# Patient Record
Sex: MALE | Race: WHITE
[De-identification: names, ages, dates, MRNs, and addresses within clinical notes are randomized per-mention and may not be internally consistent; named-entity substitution may affect disease eponyms.]

---

## 2021-12-09 ENCOUNTER — HOSPITAL ENCOUNTER (INPATIENT)
Dept: HOSPITAL 12 - ER | Age: 76
LOS: 2 days | Discharge: HOME | DRG: 391 | End: 2021-12-11
Payer: COMMERCIAL

## 2021-12-09 VITALS — WEIGHT: 200 LBS | BODY MASS INDEX: 31.39 KG/M2 | HEIGHT: 67 IN

## 2021-12-09 DIAGNOSIS — Z95.5: ICD-10-CM

## 2021-12-09 DIAGNOSIS — Z79.01: ICD-10-CM

## 2021-12-09 DIAGNOSIS — R73.9: ICD-10-CM

## 2021-12-09 DIAGNOSIS — E78.5: ICD-10-CM

## 2021-12-09 DIAGNOSIS — Z86.73: ICD-10-CM

## 2021-12-09 DIAGNOSIS — N17.0: ICD-10-CM

## 2021-12-09 DIAGNOSIS — K59.00: Primary | ICD-10-CM

## 2021-12-09 DIAGNOSIS — R11.0: ICD-10-CM

## 2021-12-09 DIAGNOSIS — G62.9: ICD-10-CM

## 2021-12-09 DIAGNOSIS — I25.10: ICD-10-CM

## 2021-12-09 PROCEDURE — G0378 HOSPITAL OBSERVATION PER HR: HCPCS

## 2021-12-09 PROCEDURE — A4663 DIALYSIS BLOOD PRESSURE CUFF: HCPCS

## 2021-12-10 VITALS — DIASTOLIC BLOOD PRESSURE: 83 MMHG | SYSTOLIC BLOOD PRESSURE: 148 MMHG

## 2021-12-10 VITALS — SYSTOLIC BLOOD PRESSURE: 136 MMHG | DIASTOLIC BLOOD PRESSURE: 90 MMHG

## 2021-12-10 VITALS — DIASTOLIC BLOOD PRESSURE: 75 MMHG | SYSTOLIC BLOOD PRESSURE: 154 MMHG

## 2021-12-10 VITALS — DIASTOLIC BLOOD PRESSURE: 84 MMHG | SYSTOLIC BLOOD PRESSURE: 161 MMHG

## 2021-12-10 LAB
ALP SERPL-CCNC: 79 U/L (ref 50–136)
ALT SERPL W/O P-5'-P-CCNC: 22 U/L (ref 16–63)
APPEARANCE UR: CLEAR
AST SERPL-CCNC: 21 U/L (ref 15–37)
BILIRUB DIRECT SERPL-MCNC: 0.2 MG/DL (ref 0–0.2)
BILIRUB SERPL-MCNC: 1.1 MG/DL (ref 0.2–1)
BILIRUB UR QL STRIP: (no result)
BUN SERPL-MCNC: 19 MG/DL (ref 7–18)
CHLORIDE SERPL-SCNC: 104 MMOL/L (ref 98–107)
CO2 SERPL-SCNC: 28 MMOL/L (ref 21–32)
COLOR UR: (no result)
CREAT SERPL-MCNC: 1.4 MG/DL (ref 0.6–1.3)
DEPRECATED SQUAMOUS URNS QL MICRO: (no result) /HPF
GLUCOSE SERPL-MCNC: 179 MG/DL (ref 74–106)
GLUCOSE UR STRIP-MCNC: NEGATIVE MG/DL
HCT VFR BLD AUTO: 40.6 % (ref 36.7–47.1)
HGB UR QL STRIP: NEGATIVE
KETONES UR STRIP-MCNC: NEGATIVE MG/DL
LEUKOCYTE ESTERASE UR QL STRIP: NEGATIVE
LIPASE SERPL-CCNC: 82 U/L (ref 73–393)
MCH RBC QN AUTO: 33.7 UUG (ref 23.8–33.4)
MCV RBC AUTO: 95.6 FL (ref 73–96.2)
NITRITE UR QL STRIP: NEGATIVE
PH UR STRIP: 6 [PH] (ref 5–8)
PLATELET # BLD AUTO: 269 K/UL (ref 152–348)
POTASSIUM SERPL-SCNC: 4.2 MMOL/L (ref 3.5–5.1)
RBC #/AREA URNS HPF: (no result) /HPF (ref 0–3)
SP GR UR STRIP: >=1.03 (ref 1–1.03)
UROBILINOGEN UR STRIP-MCNC: 0.2 E.U./DL
WBC #/AREA URNS HPF: (no result) /HPF
WBC #/AREA URNS HPF: (no result) /HPF (ref 0–3)
WS STN SPEC: 7.5 G/DL (ref 6.4–8.2)

## 2021-12-10 RX ADMIN — APIXABAN SCH MG: 5 TABLET, FILM COATED ORAL at 20:05

## 2021-12-10 RX ADMIN — HYDROMORPHONE HYDROCHLORIDE PRN MG: 1 INJECTION, SOLUTION INTRAMUSCULAR; INTRAVENOUS; SUBCUTANEOUS at 04:25

## 2021-12-10 RX ADMIN — SODIUM CHLORIDE PRN MLS/HR: 0.9 INJECTION, SOLUTION INTRAVENOUS at 07:26

## 2021-12-10 RX ADMIN — GABAPENTIN SCH MG: 100 CAPSULE ORAL at 12:18

## 2021-12-10 RX ADMIN — METHOCARBAMOL TABLETS SCH MG: 750 TABLET, COATED ORAL at 07:53

## 2021-12-10 RX ADMIN — METOCLOPRAMIDE SCH MG: 5 INJECTION, SOLUTION INTRAMUSCULAR; INTRAVENOUS at 12:18

## 2021-12-10 RX ADMIN — GABAPENTIN SCH MG: 100 CAPSULE ORAL at 16:40

## 2021-12-10 RX ADMIN — METOCLOPRAMIDE SCH MG: 5 INJECTION, SOLUTION INTRAMUSCULAR; INTRAVENOUS at 07:26

## 2021-12-10 RX ADMIN — METHOCARBAMOL TABLETS SCH MG: 750 TABLET, COATED ORAL at 20:04

## 2021-12-10 RX ADMIN — METHOCARBAMOL TABLETS SCH MG: 750 TABLET, COATED ORAL at 12:18

## 2021-12-10 RX ADMIN — METHOCARBAMOL TABLETS SCH MG: 750 TABLET, COATED ORAL at 16:41

## 2021-12-10 RX ADMIN — RANOLAZINE SCH MG: 500 TABLET, FILM COATED, EXTENDED RELEASE ORAL at 07:52

## 2021-12-10 RX ADMIN — ESCITALOPRAM OXALATE SCH MG: 10 TABLET, FILM COATED ORAL at 09:00

## 2021-12-10 RX ADMIN — SODIUM CHLORIDE PRN MLS/HR: 0.9 INJECTION, SOLUTION INTRAVENOUS at 22:19

## 2021-12-10 RX ADMIN — HYDROMORPHONE HYDROCHLORIDE PRN MG: 1 INJECTION, SOLUTION INTRAMUSCULAR; INTRAVENOUS; SUBCUTANEOUS at 10:45

## 2021-12-10 RX ADMIN — RANOLAZINE SCH MG: 500 TABLET, FILM COATED, EXTENDED RELEASE ORAL at 16:41

## 2021-12-10 RX ADMIN — ASPIRIN SCH MG: 81 TABLET, CHEWABLE ORAL at 07:47

## 2021-12-10 RX ADMIN — METOCLOPRAMIDE SCH MG: 5 INJECTION, SOLUTION INTRAMUSCULAR; INTRAVENOUS at 16:42

## 2021-12-10 RX ADMIN — GABAPENTIN SCH MG: 100 CAPSULE ORAL at 07:52

## 2021-12-10 NOTE — NUR
Patient ambulated with steady gait. Speech clear, speaks in complete sentences. 
A/Ox4 no evidence of any acute neuro deficits. Patient came for c/o feeling 
"bloated", bowel movements have decreased in frequency and consistency 
recently. Reports feeling nauseous with no active vomiting.



Patient in bed at lowest position, sr upx2, call light within reach. Fall and 
safety precautions implemented per protocol.

## 2021-12-10 NOTE — NUR
[facility] Dr[facility] Lake aware of 
patient case. Awaiting to transport patient to inpatient floor.

## 2021-12-11 VITALS — SYSTOLIC BLOOD PRESSURE: 143 MMHG | DIASTOLIC BLOOD PRESSURE: 89 MMHG

## 2021-12-11 VITALS — DIASTOLIC BLOOD PRESSURE: 79 MMHG | SYSTOLIC BLOOD PRESSURE: 158 MMHG

## 2021-12-11 LAB
ALP SERPL-CCNC: 76 U/L (ref 50–136)
ALT SERPL W/O P-5'-P-CCNC: 21 U/L (ref 16–63)
AST SERPL-CCNC: 18 U/L (ref 15–37)
BILIRUB SERPL-MCNC: 1.1 MG/DL (ref 0.2–1)
BUN SERPL-MCNC: 11 MG/DL (ref 7–18)
CHLORIDE SERPL-SCNC: 107 MMOL/L (ref 98–107)
CO2 SERPL-SCNC: 29 MMOL/L (ref 21–32)
CREAT SERPL-MCNC: 1.1 MG/DL (ref 0.6–1.3)
GLUCOSE SERPL-MCNC: 98 MG/DL (ref 74–106)
HCT VFR BLD AUTO: 38 % (ref 36.7–47.1)
LIPASE SERPL-CCNC: 96 U/L (ref 73–393)
MAGNESIUM SERPL-MCNC: 2.1 MG/DL (ref 1.8–2.4)
MCH RBC QN AUTO: 32.6 UUG (ref 23.8–33.4)
MCV RBC AUTO: 95.9 FL (ref 73–96.2)
PHOSPHATE SERPL-MCNC: 3 MG/DL (ref 2.5–4.9)
PLATELET # BLD AUTO: 234 K/UL (ref 152–348)
POTASSIUM SERPL-SCNC: 3.9 MMOL/L (ref 3.5–5.1)
WS STN SPEC: 7.1 G/DL (ref 6.4–8.2)

## 2021-12-11 RX ADMIN — METHOCARBAMOL TABLETS SCH MG: 750 TABLET, COATED ORAL at 09:00

## 2021-12-11 RX ADMIN — HYDROMORPHONE HYDROCHLORIDE PRN MG: 1 INJECTION, SOLUTION INTRAMUSCULAR; INTRAVENOUS; SUBCUTANEOUS at 05:17

## 2021-12-11 RX ADMIN — GABAPENTIN SCH MG: 100 CAPSULE ORAL at 09:00

## 2021-12-11 RX ADMIN — GABAPENTIN SCH MG: 100 CAPSULE ORAL at 12:11

## 2021-12-11 RX ADMIN — RANOLAZINE SCH MG: 500 TABLET, FILM COATED, EXTENDED RELEASE ORAL at 09:00

## 2021-12-11 RX ADMIN — APIXABAN SCH MG: 5 TABLET, FILM COATED ORAL at 09:00

## 2021-12-11 RX ADMIN — METOCLOPRAMIDE SCH MG: 5 INJECTION, SOLUTION INTRAMUSCULAR; INTRAVENOUS at 00:00

## 2021-12-11 RX ADMIN — ASPIRIN SCH MG: 81 TABLET, CHEWABLE ORAL at 09:00

## 2021-12-11 RX ADMIN — METOCLOPRAMIDE SCH MG: 5 INJECTION, SOLUTION INTRAMUSCULAR; INTRAVENOUS at 12:11

## 2021-12-11 RX ADMIN — METOCLOPRAMIDE SCH MG: 5 INJECTION, SOLUTION INTRAMUSCULAR; INTRAVENOUS at 05:08

## 2021-12-11 RX ADMIN — ESCITALOPRAM OXALATE SCH MG: 10 TABLET, FILM COATED ORAL at 09:00

## 2021-12-11 RX ADMIN — METHOCARBAMOL TABLETS SCH MG: 750 TABLET, COATED ORAL at 12:13

## 2021-12-11 NOTE — NUR
Patient resting in bed. AOx4. On room air. No signs of acute distress. Patient denies pain/ 
discomfort at this time. IV access patent and intact. Call light within reach. Bed alarm on 
for safety. Will continue to monitor.

## 2021-12-11 NOTE — NUR
Discharged patient to home. AOx4. On room air. No signs of acute distress. Discharge 
instructions given to patient and patient verbalized understanding. Belongings accounted for 
and belongings list signed. IV access removed. ID armband removed. Patient left hospital via 
private car.